# Patient Record
Sex: MALE | Race: WHITE | NOT HISPANIC OR LATINO | Employment: FULL TIME | URBAN - METROPOLITAN AREA
[De-identification: names, ages, dates, MRNs, and addresses within clinical notes are randomized per-mention and may not be internally consistent; named-entity substitution may affect disease eponyms.]

---

## 2022-12-20 ASSESSMENT — ENCOUNTER SYMPTOMS
MYALGIAS: 0
CONSTIPATION: 0
SORE THROAT: 0
PARESTHESIAS: 0
HEMATOCHEZIA: 0
CHILLS: 0
DYSURIA: 0
HEMATURIA: 0
JOINT SWELLING: 0
DIARRHEA: 0
DIZZINESS: 0
HEADACHES: 0
ABDOMINAL PAIN: 0
SHORTNESS OF BREATH: 0
PALPITATIONS: 0
EYE PAIN: 0
COUGH: 0
HEARTBURN: 0
NAUSEA: 0
NERVOUS/ANXIOUS: 0
WEAKNESS: 0
FEVER: 0
FREQUENCY: 0

## 2022-12-27 ENCOUNTER — OFFICE VISIT (OUTPATIENT)
Dept: FAMILY MEDICINE | Facility: CLINIC | Age: 57
End: 2022-12-27
Payer: COMMERCIAL

## 2022-12-27 VITALS
TEMPERATURE: 97.8 F | OXYGEN SATURATION: 95 % | HEIGHT: 66 IN | SYSTOLIC BLOOD PRESSURE: 134 MMHG | HEART RATE: 60 BPM | BODY MASS INDEX: 28.12 KG/M2 | RESPIRATION RATE: 16 BRPM | DIASTOLIC BLOOD PRESSURE: 82 MMHG | WEIGHT: 175 LBS

## 2022-12-27 DIAGNOSIS — Z00.00 ROUTINE GENERAL MEDICAL EXAMINATION AT A HEALTH CARE FACILITY: Primary | ICD-10-CM

## 2022-12-27 DIAGNOSIS — Z12.11 SCREEN FOR COLON CANCER: ICD-10-CM

## 2022-12-27 DIAGNOSIS — Z83.3 FAMILY HISTORY OF DIABETES MELLITUS: ICD-10-CM

## 2022-12-27 DIAGNOSIS — Z13.220 SCREENING FOR HYPERLIPIDEMIA: ICD-10-CM

## 2022-12-27 DIAGNOSIS — F10.21 ALCOHOL DEPENDENCE IN REMISSION (H): ICD-10-CM

## 2022-12-27 LAB
ALBUMIN SERPL BCG-MCNC: 4.7 G/DL (ref 3.5–5.2)
ALP SERPL-CCNC: 57 U/L (ref 40–129)
ALT SERPL W P-5'-P-CCNC: 30 U/L (ref 10–50)
ANION GAP SERPL CALCULATED.3IONS-SCNC: 14 MMOL/L (ref 7–15)
AST SERPL W P-5'-P-CCNC: 27 U/L (ref 10–50)
BILIRUB SERPL-MCNC: 0.3 MG/DL
BUN SERPL-MCNC: 13.2 MG/DL (ref 6–20)
CALCIUM SERPL-MCNC: 9.6 MG/DL (ref 8.6–10)
CHLORIDE SERPL-SCNC: 105 MMOL/L (ref 98–107)
CHOLEST SERPL-MCNC: 241 MG/DL
CREAT SERPL-MCNC: 0.96 MG/DL (ref 0.67–1.17)
DEPRECATED HCO3 PLAS-SCNC: 22 MMOL/L (ref 22–29)
GFR SERPL CREATININE-BSD FRML MDRD: >90 ML/MIN/1.73M2
GLUCOSE SERPL-MCNC: 96 MG/DL (ref 70–99)
HBA1C MFR BLD: 5.8 % (ref 0–5.6)
HCV AB SERPL QL IA: NONREACTIVE
HDLC SERPL-MCNC: 36 MG/DL
HIV 1+2 AB+HIV1 P24 AG SERPL QL IA: NONREACTIVE
LDLC SERPL CALC-MCNC: 155 MG/DL
NONHDLC SERPL-MCNC: 205 MG/DL
POTASSIUM SERPL-SCNC: 4 MMOL/L (ref 3.4–5.3)
PROT SERPL-MCNC: 7.1 G/DL (ref 6.4–8.3)
PSA SERPL-MCNC: 1.04 NG/ML (ref 0–3.5)
SODIUM SERPL-SCNC: 141 MMOL/L (ref 136–145)
TRIGL SERPL-MCNC: 251 MG/DL

## 2022-12-27 PROCEDURE — 36415 COLL VENOUS BLD VENIPUNCTURE: CPT | Performed by: FAMILY MEDICINE

## 2022-12-27 PROCEDURE — 87389 HIV-1 AG W/HIV-1&-2 AB AG IA: CPT | Performed by: FAMILY MEDICINE

## 2022-12-27 PROCEDURE — 80053 COMPREHEN METABOLIC PANEL: CPT | Performed by: FAMILY MEDICINE

## 2022-12-27 PROCEDURE — 86803 HEPATITIS C AB TEST: CPT | Performed by: FAMILY MEDICINE

## 2022-12-27 PROCEDURE — G0103 PSA SCREENING: HCPCS | Performed by: FAMILY MEDICINE

## 2022-12-27 PROCEDURE — 90715 TDAP VACCINE 7 YRS/> IM: CPT | Performed by: FAMILY MEDICINE

## 2022-12-27 PROCEDURE — 99386 PREV VISIT NEW AGE 40-64: CPT | Mod: 25 | Performed by: FAMILY MEDICINE

## 2022-12-27 PROCEDURE — 80061 LIPID PANEL: CPT | Performed by: FAMILY MEDICINE

## 2022-12-27 PROCEDURE — 83036 HEMOGLOBIN GLYCOSYLATED A1C: CPT | Performed by: FAMILY MEDICINE

## 2022-12-27 PROCEDURE — 90471 IMMUNIZATION ADMIN: CPT | Performed by: FAMILY MEDICINE

## 2022-12-27 ASSESSMENT — ENCOUNTER SYMPTOMS
DYSURIA: 0
CHILLS: 0
HEARTBURN: 0
SHORTNESS OF BREATH: 0
NERVOUS/ANXIOUS: 0
ABDOMINAL PAIN: 0
PALPITATIONS: 0
MYALGIAS: 0
CONSTIPATION: 0
WEAKNESS: 0
HEMATURIA: 0
PARESTHESIAS: 0
EYE PAIN: 0
NAUSEA: 0
FEVER: 0
FREQUENCY: 0
HEADACHES: 0
HEMATOCHEZIA: 0
DIZZINESS: 0
JOINT SWELLING: 0
DIARRHEA: 0
SORE THROAT: 0
COUGH: 0

## 2022-12-27 ASSESSMENT — PAIN SCALES - GENERAL: PAINLEVEL: NO PAIN (0)

## 2022-12-27 NOTE — NURSING NOTE
Prior to immunization administration, verified patients identity using patient s name and date of birth. Please see Immunization Activity for additional information.     Screening Questionnaire for Adult Immunization    Are you sick today?   No   Do you have allergies to medications, food, a vaccine component or latex?   No   Have you ever had a serious reaction after receiving a vaccination?   No   Do you have a long-term health problem with heart, lung, kidney, or metabolic disease (e.g., diabetes), asthma, a blood disorder, no spleen, complement component deficiency, a cochlear implant, or a spinal fluid leak?  Are you on long-term aspirin therapy?   No   Do you have cancer, leukemia, HIV/AIDS, or any other immune system problem?   No   Do you have a parent, brother, or sister with an immune system problem?   No   In the past 3 months, have you taken medications that affect  your immune system, such as prednisone, other steroids, or anticancer drugs; drugs for the treatment of rheumatoid arthritis, Crohn s disease, or psoriasis; or have you had radiation treatments?   No   Have you had a seizure, or a brain or other nervous system problem?   No   During the past year, have you received a transfusion of blood or blood    products, or been given immune (gamma) globulin or antiviral drug?   No   For women: Are you pregnant or is there a chance you could become       pregnant during the next month?   No   Have you received any vaccinations in the past 4 weeks?   No     Immunization questionnaire answers were all negative.        Per orders of Dr. Lassiter, injection of Tdap (adacel) given by Anna Todd. Patient instructed to remain in clinic for 15 minutes afterwards, and to report any adverse reaction to me immediately.       Screening performed by Anna Todd on 12/27/2022 at 7:50 AM.

## 2022-12-27 NOTE — PROGRESS NOTES
SUBJECTIVE:   CC: Timo is an 57 year old who presents for preventative health visit.     Patient has been advised of split billing requirements and indicates understanding: Yes     Healthy Habits:     Getting at least 3 servings of Calcium per day:  NO    Bi-annual eye exam:  Yes    Dental care twice a year:  Yes    Sleep apnea or symptoms of sleep apnea:  None    Diet:  Carbohydrate counting    Frequency of exercise:  2-3 days/week    Duration of exercise:  15-30 minutes    Taking medications regularly:  Not Applicable    Medication side effects:  Not applicable    PHQ-2 Total Score: 0    Additional concerns today:  Yes    Hasn't been seen for primary care hardly at all - usually doesn't have good insurance, so wary of finding out something at a physical that will cost a lot.  [facility], but they recommended expensive testosterone treatment, then said he didn't need it.      Has always been heavier set, sugar addiction.  Either goes no/low carb, low fat, until he doesn't, and then it's 'game over'  Wt often ranges from ~165 lbs, now at ~172 lbs.  Can climb to 180s-190s- usually pretty rare, wake up call.    Likes to run, though, likes running in groups.  Running helps with wt maintenance.  Hasn't been running as much with the pandemic, and now is working a lot more this past year.  Exercise- 2-3x/wk, runs 3-6 miles.  Can get up to 50 mile races.    Current diet-   This month, not as great.    Does a lot of one meal a day fasting, drinks bullet proof coffee (butter and MCT oil mixed in- sips on that all night).      Two full time jobs currently, likely just this year, but he's now much busier than normal for him. Working overnights, facility maintenance.  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  Never usually gets health insurance with these jobs.    With  this schedule, thinks he's probably getting 6-8 hrs of sleep on average a day.  Sometimes 4 hrs and 4 hrs and then 12 hrs.    Has partner of 25 yrs, no kids.    Sober from etoh x 20 yrs.  Through AA.         Today's PHQ-2 Score:   PHQ-2 ( 1999 [employer]) 12/20/2022   Q1: Little interest or pleasure in doing things 0   Q2: Feeling down, depressed or hopeless 0   PHQ-2 Score 0   Q1: Little interest or pleasure in doing things Not at all   Q2: Feeling down, depressed or hopeless Not at all   PHQ-2 Score 0       Have you ever done Advance Care Planning? (For example, a Health Directive, POLST, or a discussion with a medical provider or your loved ones about your wishes): No, advance care planning information given to patient to review.  Patient plans to discuss their wishes with loved ones or provider.      Social History     Tobacco Use     Smoking status: Never     Smokeless tobacco: Never   Substance Use Topics     Alcohol use: Not Currently     Comment: Sober 20 Years. Alcoholic     If you drink alcohol do you typically have >3 drinks per day or >7 drinks per week? No    No flowsheet data found.    Last PSA: No results found for: PSA    Reviewed orders with patient. Reviewed health maintenance and updated orders accordingly - Yes      Lab work is in process  Labs reviewed in EPIC  BP Readings from Last 3 Encounters:   12/27/22 134/82    Wt Readings from Last 3 Encounters:   12/27/22 79.4 kg (175 lb)                  Patient Active Problem List   Diagnosis     Alcohol dependence in remission (H)     Past Surgical History:   Procedure Laterality Date     CHOLECYSTECTOMY  10/24/2014     COLONOSCOPY  1/15/18       Social History     Tobacco Use     Smoking status: Never     Smokeless tobacco: Never   Substance Use Topics     Alcohol use: Not Currently     Comment: Sober 20 Years. Alcoholic     Family History   Problem Relation Age of Onset     Coronary Artery Disease Mother 70        stent?, past smoker, poor diet      "Hypertension Mother      Diabetes Father         dx in 50s likely     Coronary Artery Disease Father 72        Stent and/or bypass, smoker     Hypertension Father      Depression Sister      Substance Abuse Sister         etoh     Multiple Sclerosis Sister          No current outpatient medications on file.     Allergies   Allergen Reactions     Seasonal Allergies      Recent Labs   Lab Test 12/27/22  0758   A1C 5.8*        Reviewed and updated as needed this visit by clinical staff   Tobacco  Allergies  Meds  Problems  Med Hx  Surg Hx  Fam Hx          Reviewed and updated as needed this visit by Provider   Tobacco  Allergies  Meds  Problems  Med Hx  Surg Hx  Fam Hx             Review of Systems   Constitutional: Negative for chills and fever.   HENT: Negative for congestion, ear pain, hearing loss and sore throat.    Eyes: Negative for pain and visual disturbance.   Respiratory: Negative for cough and shortness of breath.    Cardiovascular: Negative for chest pain, palpitations and peripheral edema.   Gastrointestinal: Negative for abdominal pain, constipation, diarrhea, heartburn, hematochezia and nausea.   Genitourinary: Negative for dysuria, frequency, genital sores, hematuria, impotence, penile discharge and urgency.   Musculoskeletal: Negative for joint swelling and myalgias.   Skin: Negative for rash.   Neurological: Negative for dizziness, weakness, headaches and paresthesias.   Psychiatric/Behavioral: Negative for mood changes. The patient is not nervous/anxious.        OBJECTIVE:   /82 (BP Location: Left arm, Patient Position: Sitting, Cuff Size: Adult Regular)   Pulse 60   Temp 97.8  F (36.6  C) (Temporal)   Resp 16   Ht 1.676 m (5' 6\")   Wt 79.4 kg (175 lb)   SpO2 95%   BMI 28.25 kg/m      Physical Exam  GENERAL: healthy, alert and no distress  EYES: Eyes grossly normal to inspection, PERRL and conjunctivae and sclerae normal  HENT: ear canals and TM's normal, nose and mouth " without ulcers or lesions  NECK: no adenopathy, no asymmetry, masses, or scars and thyroid normal to palpation  RESP: lungs clear to auscultation - no rales, rhonchi or wheezes  CV: regular rate and rhythm, normal S1 S2, no S3 or S4, no murmur, click or rub, no peripheral edema and peripheral pulses strong  ABDOMEN: soft, nontender, no hepatosplenomegaly, no masses and bowel sounds normal  MS: no gross musculoskeletal defects noted, no edema  SKIN: no suspicious lesions or rashes  NEURO: Normal strength and tone, mentation intact and speech normal  PSYCH: mentation appears normal, affect normal/bright    Diagnostic Test Results:  Labs reviewed in Epic    ASSESSMENT/PLAN:       ICD-10-CM    1. Routine general medical examination at a health care facility  Z00.00 HIV Antigen Antibody Combo     Hepatitis C Screen Reflex to HCV RNA Quant and Genotype     TDAP VACCINE (Adacel, Boostrix)  [[id number]]     PSA, screen     HIV Antigen Antibody Combo     Hepatitis C Screen Reflex to HCV RNA Quant and Genotype     PSA, screen      2. BMI 28.0-28.9,adult  Z68.28       3. Family history of diabetes mellitus  Z83.3 Comprehensive metabolic panel (BMP + Alb, Alk Phos, ALT, AST, Total. Bili, TP)     Hemoglobin A1c     Comprehensive metabolic panel (BMP + Alb, Alk Phos, ALT, AST, Total. Bili, TP)     Hemoglobin A1c      4. Alcohol dependence in remission (H)  F10.21       5. Screening for hyperlipidemia  Z13.220 Lipid panel reflex to direct LDL Non-fasting     Lipid panel reflex to direct LDL Non-fasting      6. Screen for colon cancer  Z12.11         CPE- Reviewed chronic issues and medications/supplements.   Discussed healthy habits, eye/dental care, healthcare maintenance issues, including cancer screenings (colonoscopies, PSA), relevant immunizations, and cardiac risk factor screenings such as for cholesterol, HTN, and DM.  See orders for tests and screening needed.  Shared decision making with PSA testing.  Tdap immunization- due  next year, but with inconsistent cares/appts, will do today. Risks/benefits discussed.    BMI 28, fam h/o DM/CAD-  Both parents with CAD in their 50-60s, but smokers, worse diet.  Will check fasting  labs as above.  Pt will continue to work on sugar addiction. Continue running some, but likely not as much as in past with extra work for at least this next year.    Social-   Etoh- sober x 20 yrs.  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      Patient has been advised of split billing requirements and indicates understanding: Yes      COUNSELING:   Reviewed preventive health counseling, as reflected in patient instructions       Regular exercise       Healthy diet/nutrition       Safe sex practices/STD prevention       Consider Hep C screening for all patients one time for ages 18-79 years       HIV screeninx in teen years, 1x in adult years, and at intervals if high risk       Colorectal cancer screening       Prostate cancer screening       Osteoporosis prevention/bone health       Advance Care Planning        He reports that he has never smoked. He has never used smokeless tobacco.      Glenny Lassiter MD  [facility]

## 2023-01-16 NOTE — RESULT ENCOUNTER NOTE
-Your HIV and Hepatitis C screening labs are negative.   -Your prostate cancer screening test (PSA) is low which is reassuring.  -Your CMP (which includes electrolyte levels, blood sugar levels, and kidney and liver function tests) is normal, including the fasting glucose level at 96 (<100 is normal).  -Your hemoglobin A1C (the three month average of your glucose levels) is just a bit elevated at 5.8, into the pre-diabetic range (5.7-6.4).  We should check this again next year.  In the meantime, lowering your intake of sweets and simple (usually white/refined) carbohydrates can help.  -Your cholesterol panel looks abnormal with an elevated LDL (the bad cholesterol, which is improved by lowering intake of saturated fats, and more fruits/vegetables, lean meats) and a lower HDL (the good cholesterol, which is improve with aerobic exercise). Your triglycerides are also elevated (which sometimes go along with higher glucose issues, so same recommendations as for the A1C).    Please let me know if you have any questions, and consider scheduling a virtual video visit if you'd like to discuss the results/options in further depth, and/or coming in for fasting labs prior to your next physical in a year so we can talk about the labs at your appointment.     Best,   Raymond Lassiter MD

## 2024-11-16 ENCOUNTER — HEALTH MAINTENANCE LETTER (OUTPATIENT)
Age: 59
End: 2024-11-16